# Patient Record
Sex: FEMALE | Race: WHITE | HISPANIC OR LATINO | Employment: OTHER | URBAN - METROPOLITAN AREA
[De-identification: names, ages, dates, MRNs, and addresses within clinical notes are randomized per-mention and may not be internally consistent; named-entity substitution may affect disease eponyms.]

---

## 2023-09-27 ENCOUNTER — OFFICE VISIT (OUTPATIENT)
Dept: GASTROENTEROLOGY | Facility: CLINIC | Age: 58
End: 2023-09-27
Payer: COMMERCIAL

## 2023-09-27 ENCOUNTER — LAB (OUTPATIENT)
Dept: LAB | Facility: HOSPITAL | Age: 58
End: 2023-09-27
Payer: COMMERCIAL

## 2023-09-27 VITALS
WEIGHT: 219.8 LBS | SYSTOLIC BLOOD PRESSURE: 130 MMHG | DIASTOLIC BLOOD PRESSURE: 86 MMHG | BODY MASS INDEX: 36.62 KG/M2 | HEIGHT: 65 IN

## 2023-09-27 DIAGNOSIS — Z12.11 SCREENING FOR MALIGNANT NEOPLASM OF COLON: ICD-10-CM

## 2023-09-27 DIAGNOSIS — K74.60 CIRRHOSIS OF LIVER WITHOUT ASCITES, UNSPECIFIED HEPATIC CIRRHOSIS TYPE: Primary | ICD-10-CM

## 2023-09-27 LAB
ALPHA-FETOPROTEIN: 2.98 NG/ML (ref 0–8.3)
BASOPHILS # BLD AUTO: 0.02 10*3/MM3 (ref 0–0.2)
BASOPHILS NFR BLD AUTO: 0.3 % (ref 0–1.5)
DEPRECATED RDW RBC AUTO: 41.2 FL (ref 37–54)
EOSINOPHIL # BLD AUTO: 0.05 10*3/MM3 (ref 0–0.4)
EOSINOPHIL NFR BLD AUTO: 0.8 % (ref 0.3–6.2)
ERYTHROCYTE [DISTWIDTH] IN BLOOD BY AUTOMATED COUNT: 12.2 % (ref 12.3–15.4)
HBV SURFACE AG SERPL QL IA: NORMAL
HCT VFR BLD AUTO: 43.6 % (ref 34–46.6)
HCV AB SER DONR QL: NORMAL
HGB BLD-MCNC: 14.1 G/DL (ref 12–15.9)
IMM GRANULOCYTES # BLD AUTO: 0.01 10*3/MM3 (ref 0–0.05)
IMM GRANULOCYTES NFR BLD AUTO: 0.2 % (ref 0–0.5)
INR PPP: 1 (ref 0.9–1.1)
LYMPHOCYTES # BLD AUTO: 2.05 10*3/MM3 (ref 0.7–3.1)
LYMPHOCYTES NFR BLD AUTO: 34.1 % (ref 19.6–45.3)
MCH RBC QN AUTO: 29.3 PG (ref 26.6–33)
MCHC RBC AUTO-ENTMCNC: 32.3 G/DL (ref 31.5–35.7)
MCV RBC AUTO: 90.5 FL (ref 79–97)
MONOCYTES # BLD AUTO: 0.34 10*3/MM3 (ref 0.1–0.9)
MONOCYTES NFR BLD AUTO: 5.7 % (ref 5–12)
NEUTROPHILS NFR BLD AUTO: 3.54 10*3/MM3 (ref 1.7–7)
NEUTROPHILS NFR BLD AUTO: 58.9 % (ref 42.7–76)
PLATELET # BLD AUTO: 252 10*3/MM3 (ref 140–450)
PMV BLD AUTO: 10 FL (ref 6–12)
PROTHROMBIN TIME: 13.2 SECONDS (ref 12.1–15)
RBC # BLD AUTO: 4.82 10*6/MM3 (ref 3.77–5.28)
WBC NRBC COR # BLD: 6.01 10*3/MM3 (ref 3.4–10.8)

## 2023-09-27 PROCEDURE — 82104 ALPHA-1-ANTITRYPSIN PHENO: CPT

## 2023-09-27 PROCEDURE — 86803 HEPATITIS C AB TEST: CPT

## 2023-09-27 PROCEDURE — 85610 PROTHROMBIN TIME: CPT

## 2023-09-27 PROCEDURE — 82105 ALPHA-FETOPROTEIN SERUM: CPT

## 2023-09-27 PROCEDURE — 36415 COLL VENOUS BLD VENIPUNCTURE: CPT

## 2023-09-27 PROCEDURE — 85025 COMPLETE CBC W/AUTO DIFF WBC: CPT

## 2023-09-27 PROCEDURE — 87340 HEPATITIS B SURFACE AG IA: CPT

## 2023-09-27 PROCEDURE — 82103 ALPHA-1-ANTITRYPSIN TOTAL: CPT

## 2023-09-27 RX ORDER — HYDROCHLOROTHIAZIDE 25 MG/1
25 TABLET ORAL DAILY
COMMUNITY
Start: 2023-06-20

## 2023-09-27 RX ORDER — ASPIRIN 81 MG/1
81 TABLET ORAL DAILY
COMMUNITY
Start: 2023-06-20

## 2023-09-27 RX ORDER — PRAVASTATIN SODIUM 10 MG
10 TABLET ORAL DAILY
Qty: 30 TABLET | Refills: 11 | COMMUNITY
Start: 2023-06-20 | End: 2024-06-19

## 2023-09-27 RX ORDER — DULOXETIN HYDROCHLORIDE 60 MG/1
60 CAPSULE, DELAYED RELEASE ORAL DAILY
COMMUNITY
Start: 2023-06-20

## 2023-09-27 RX ORDER — DIPHENOXYLATE HYDROCHLORIDE AND ATROPINE SULFATE 2.5; .025 MG/1; MG/1
1 TABLET ORAL
COMMUNITY

## 2023-09-27 RX ORDER — TELMISARTAN 20 MG/1
20 TABLET ORAL DAILY
COMMUNITY
Start: 2023-06-20

## 2023-09-27 NOTE — PROGRESS NOTES
PATIENT INFORMATION  Willis Erickson       - 1965    CHIEF COMPLAINT  Chief Complaint   Patient presents with    Cirrhosis       HISTORY OF PRESENT ILLNESS    Here today for evaluation of cirrhosis    Cirrhosis:   8/15/2023 Last CT Abd/Pelv hepatic cirrhosis  Last AFP: None  Last Labs:  Fluid Management: No ascites or diuretics, reviewed decreasing dietary sodium  Varices: No BB or previous EGD  HE: Cognitive Baseline    No issues with HB or reflux, no UGI complaints. No meds for GERD, no NSAIDs. Tylenol PRN, reviewed recommendations with cirrhosis.    Recently diagnosed with RA, mild ferritin elevation.    Bowels are moving daily, easy, no straining, no bleeding.    Cologuard 5 yrs ago. Dad with polyps.    Cirrhosis  Associated symptoms include congestion, coughing and fatigue. Pertinent negatives include no abdominal pain, nausea or vomiting.     REVIEWED PERTINENT RESULTS/ LABS  No results found for: CASEREPORT, FINALDX  Lab Results   Component Value Date    HGB 13.9 2019    MCV 92.2 2019     2019    ALT 18 10/22/2021    AST 21 10/22/2021    FERRITIN 154.0 (H) 2023    IRON 102 2023    TIBC 277 2023      Mammogram [facility] Diagnostic Right Breast    Result Date: 9/15/2023  Narrative: REVIEWING YOUR TEST RESULTS IN [facility] IS NOT A SUBSTITUTE FOR DISCUSSING THOSE RESULTS WITH YOUR HEALTH CARE PROVIDER. PLEASE CONTACT YOUR PROVIDER VIA [facility] TO DISCUSS ANY QUESTIONS OR CONCERNS YOU MAY HAVE REGARDING THESE TEST RESULTS.  RADIOLOGY REPORT FACILITY:  [facility] UNIT/AGE/GENDER: KULWINDERMA  OP      AGE:58 Y          SEX:F PATIENT NAME/:  WILLIS ERICKSON OLENA    1965 UNIT NUMBER:  [id number] ACCOUNT NUMBER:  [id number] ACCESSION NUMBER:  [id number] Examination: Right Digital Diagnostic Mammogram with Tomosynthesis and CAD. Date: 09/15/2023 10:12 AM Indication: Patient is a 58 year old female. The patient is seen for follow-up at  short-interval from prior study.    The patient has the following family history of breast cancer:  sister, at age 55, breast cancer. Comparison: The present examination has been compared to prior imaging studies performed at [facility] on 02/10/2023, and at [facility] on 01/12/2023. Findings: Right Mammogram: There are scattered areas of fibroglandular density. No suspicious abnormality identified. No suspicious interval change in the previously described 4 mm group of layering calcifications in the mid one third central aspect of the right breast characteristic of a benign pattern. Impression: No suspicious interval change on short-term follow-up. Final Assessment: Probably Benign (BIRADS Category 3) Recommendations: Bilateral diagnostic mammogram with right magnification views in 6 months to document one-year stability. The patient was notified of the results and recommendations at the time of examination. A result letter will be sent to the patient.  A reminder letter for the next mammogram will be scheduled for women aged 40 and over. Dictated by: Ese Macias M.D. Images and Report reviewed and interpreted by: Ese Macias M.D. <PS><Electronically signed by: Ese Macias M.D.> 09/15/2023 1042 D: 09/15/2023 1041 T: 09/15/2023 1041     REVIEW OF SYSTEMS  Review of Systems   Constitutional:  Positive for fatigue.   HENT:  Positive for congestion and sinus pressure.    Eyes: Negative.    Respiratory:  Positive for cough.    Cardiovascular: Negative.    Gastrointestinal:  Negative for abdominal pain, constipation, diarrhea, nausea and vomiting.        Cirrhosis    Endocrine: Negative.    Genitourinary: Negative.    Musculoskeletal:  Positive for back pain.   Skin: Negative.    Allergic/Immunologic: Negative.    Neurological: Negative.    Hematological: Negative.    Psychiatric/Behavioral: Negative.         ACTIVE PROBLEMS  There are no problems to display for this  "patient.        PAST MEDICAL HISTORY  Past Medical History:   Diagnosis Date    Hypertension     Tuberculosis 2018         SURGICAL HISTORY  History reviewed. No pertinent surgical history.      FAMILY HISTORY  Family History   Problem Relation Age of Onset    Colon polyps Father     Colon cancer Neg Hx     Esophageal cancer Neg Hx     Liver cancer Neg Hx     Liver disease Neg Hx     Rectal cancer Neg Hx     Stomach cancer Neg Hx          SOCIAL HISTORY  Social History     Occupational History    Not on file   Tobacco Use    Smoking status: Never     Passive exposure: Never    Smokeless tobacco: Never   Vaping Use    Vaping Use: Never used   Substance and Sexual Activity    Alcohol use: Not Currently    Drug use: Defer    Sexual activity: Defer         CURRENT MEDICATIONS    Current Outpatient Medications:     aspirin 81 MG EC tablet, Take 1 tablet by mouth Daily., Disp: , Rfl:     DULoxetine (CYMBALTA) 60 MG capsule, Take 1 capsule by mouth Daily., Disp: , Rfl:     hydroCHLOROthiazide (HYDRODIURIL) 25 MG tablet, Take 1 tablet by mouth Daily., Disp: , Rfl:     multivitamin (THERAGRAN) tablet tablet, Take 1 tablet by mouth., Disp: , Rfl:     pravastatin (PRAVACHOL) 10 MG tablet, Take 1 tablet by mouth Daily., Disp: 30 tablet, Rfl: 11    telmisartan (MICARDIS) 20 MG tablet, Take 1 tablet by mouth Daily., Disp: , Rfl:     ALLERGIES  Patient has no known allergies.    VITALS  Vitals:    09/27/23 0844   BP: 130/86   BP Location: Left arm   Patient Position: Sitting   Cuff Size: Adult   Weight: 99.7 kg (219 lb 12.8 oz)   Height: 165.1 cm (65\")       PHYSICAL EXAM  Debilities/Disabilities Identified: None  Emotional Behavior: Appropriate  Wt Readings from Last 3 Encounters:   09/27/23 99.7 kg (219 lb 12.8 oz)     Ht Readings from Last 1 Encounters:   09/27/23 165.1 cm (65\")     Body mass index is 36.58 kg/m².  Physical Exam  Constitutional:       General: She is not in acute distress.     Appearance: Normal appearance. She " is not ill-appearing.   HENT:      Head: Normocephalic and atraumatic.      Mouth/Throat:      Mouth: Mucous membranes are moist.      Pharynx: No posterior oropharyngeal erythema.   Eyes:      General: No scleral icterus.  Cardiovascular:      Rate and Rhythm: Normal rate and regular rhythm.      Heart sounds: Normal heart sounds.   Pulmonary:      Effort: Pulmonary effort is normal.      Breath sounds: Normal breath sounds.   Abdominal:      General: Abdomen is flat. Bowel sounds are normal. There is no distension.      Palpations: Abdomen is soft. There is no mass.      Tenderness: There is no abdominal tenderness. There is no guarding or rebound. Negative signs include Hickman's sign.      Hernia: No hernia is present.   Musculoskeletal:      Cervical back: Neck supple.   Skin:     General: Skin is warm.      Capillary Refill: Capillary refill takes less than 2 seconds.   Neurological:      General: No focal deficit present.      Mental Status: She is alert and oriented to person, place, and time.   Psychiatric:         Mood and Affect: Mood normal.         Behavior: Behavior normal.         Thought Content: Thought content normal.         Judgment: Judgment normal.       CLINICAL DATA REVIEWED   reviewed previous lab results and integrated with today's visit, reviewed notes from other physicians and/or last GI encounter, reviewed previous endoscopy results and available photos, reviewed surgical pathology results from previous biopsies    ASSESSMENT  Diagnoses and all orders for this visit:    Cirrhosis of liver without ascites, unspecified hepatic cirrhosis type  -     Hepatitis C Antibody  -     AFP Tumor Marker  -     CBC & Differential  -     Protime-INR  -     Hepatitis B Surface Antigen  -     Alpha - 1 - Antitrypsin Phenotype  -     Case Request; Standing  -     Case Request  -     US Liver; Future    Screening for malignant neoplasm of colon  -     Case Request; Standing  -     Case Request    Other  orders  -     aspirin 81 MG EC tablet; Take 1 tablet by mouth Daily.  -     pravastatin (PRAVACHOL) 10 MG tablet; Take 1 tablet by mouth Daily.  -     hydroCHLOROthiazide (HYDRODIURIL) 25 MG tablet; Take 1 tablet by mouth Daily.  -     telmisartan (MICARDIS) 20 MG tablet; Take 1 tablet by mouth Daily.  -     DULoxetine (CYMBALTA) 60 MG capsule; Take 1 capsule by mouth Daily.  -     multivitamin (THERAGRAN) tablet tablet; Take 1 tablet by mouth.  -     Obtain Informed Consent; Standing  -     Follow Anesthesia Guidelines / Protocol; Future          PLAN    Liver labs  EGD and Colon  Next US February  Ok to continue statin    Return in about 6 months (around 3/27/2024).    I have discussed the above plan with the patient.  They verbalize understanding and are in agreement with the plan.  They have been advised to contact the office for any questions, concerns, or changes related to their health.

## 2023-10-03 LAB
A1AT PHENOTYP SERPL IFE: NORMAL
A1AT SERPL-MCNC: 122 MG/DL (ref 101–187)

## 2023-12-20 ENCOUNTER — ANESTHESIA EVENT (OUTPATIENT)
Dept: PERIOP | Facility: HOSPITAL | Age: 58
End: 2023-12-20
Payer: COMMERCIAL

## 2023-12-22 ENCOUNTER — HOSPITAL ENCOUNTER (OUTPATIENT)
Facility: HOSPITAL | Age: 58
Setting detail: HOSPITAL OUTPATIENT SURGERY
Discharge: HOME | End: 2023-12-22
Attending: INTERNAL MEDICINE | Admitting: INTERNAL MEDICINE
Payer: COMMERCIAL

## 2023-12-22 ENCOUNTER — ANESTHESIA (OUTPATIENT)
Dept: PERIOP | Facility: HOSPITAL | Age: 58
End: 2023-12-22
Payer: COMMERCIAL

## 2023-12-22 VITALS
RESPIRATION RATE: 16 BRPM | WEIGHT: 214 LBS | DIASTOLIC BLOOD PRESSURE: 76 MMHG | BODY MASS INDEX: 35.65 KG/M2 | SYSTOLIC BLOOD PRESSURE: 120 MMHG | HEART RATE: 63 BPM | TEMPERATURE: 97.8 F | HEIGHT: 65 IN | OXYGEN SATURATION: 96 %

## 2023-12-22 DIAGNOSIS — K74.60 CIRRHOSIS OF LIVER WITHOUT ASCITES, UNSPECIFIED HEPATIC CIRRHOSIS TYPE: ICD-10-CM

## 2023-12-22 DIAGNOSIS — Z12.11 SCREENING FOR MALIGNANT NEOPLASM OF COLON: ICD-10-CM

## 2023-12-22 PROCEDURE — 43239 EGD BIOPSY SINGLE/MULTIPLE: CPT | Performed by: INTERNAL MEDICINE

## 2023-12-22 PROCEDURE — 88305 TISSUE EXAM BY PATHOLOGIST: CPT | Performed by: INTERNAL MEDICINE

## 2023-12-22 PROCEDURE — 45385 COLONOSCOPY W/LESION REMOVAL: CPT | Performed by: INTERNAL MEDICINE

## 2023-12-22 PROCEDURE — 25810000003 LACTATED RINGERS PER 1000 ML: Performed by: NURSE ANESTHETIST, CERTIFIED REGISTERED

## 2023-12-22 PROCEDURE — 25010000002 PROPOFOL 200 MG/20ML EMULSION: Performed by: NURSE ANESTHETIST, CERTIFIED REGISTERED

## 2023-12-22 RX ORDER — LIDOCAINE HYDROCHLORIDE 20 MG/ML
INJECTION, SOLUTION INFILTRATION; PERINEURAL AS NEEDED
Status: DISCONTINUED | OUTPATIENT
Start: 2023-12-22 | End: 2023-12-22 | Stop reason: SURG

## 2023-12-22 RX ORDER — GLYCOPYRROLATE 0.2 MG/ML
INJECTION INTRAMUSCULAR; INTRAVENOUS AS NEEDED
Status: DISCONTINUED | OUTPATIENT
Start: 2023-12-22 | End: 2023-12-22 | Stop reason: SURG

## 2023-12-22 RX ORDER — PROPOFOL 10 MG/ML
INJECTION, EMULSION INTRAVENOUS AS NEEDED
Status: DISCONTINUED | OUTPATIENT
Start: 2023-12-22 | End: 2023-12-22 | Stop reason: SURG

## 2023-12-22 RX ORDER — MAGNESIUM HYDROXIDE 1200 MG/15ML
LIQUID ORAL AS NEEDED
Status: DISCONTINUED | OUTPATIENT
Start: 2023-12-22 | End: 2023-12-22 | Stop reason: HOSPADM

## 2023-12-22 RX ORDER — SODIUM CHLORIDE 9 MG/ML
40 INJECTION, SOLUTION INTRAVENOUS AS NEEDED
Status: DISCONTINUED | OUTPATIENT
Start: 2023-12-22 | End: 2023-12-22 | Stop reason: HOSPADM

## 2023-12-22 RX ORDER — SODIUM CHLORIDE, SODIUM LACTATE, POTASSIUM CHLORIDE, CALCIUM CHLORIDE 600; 310; 30; 20 MG/100ML; MG/100ML; MG/100ML; MG/100ML
9 INJECTION, SOLUTION INTRAVENOUS CONTINUOUS PRN
Status: DISCONTINUED | OUTPATIENT
Start: 2023-12-22 | End: 2023-12-22 | Stop reason: HOSPADM

## 2023-12-22 RX ORDER — ONDANSETRON 2 MG/ML
4 INJECTION INTRAMUSCULAR; INTRAVENOUS ONCE AS NEEDED
Status: DISCONTINUED | OUTPATIENT
Start: 2023-12-22 | End: 2023-12-22 | Stop reason: HOSPADM

## 2023-12-22 RX ORDER — SODIUM CHLORIDE 0.9 % (FLUSH) 0.9 %
10 SYRINGE (ML) INJECTION AS NEEDED
Status: DISCONTINUED | OUTPATIENT
Start: 2023-12-22 | End: 2023-12-22 | Stop reason: HOSPADM

## 2023-12-22 RX ORDER — SODIUM CHLORIDE 0.9 % (FLUSH) 0.9 %
10 SYRINGE (ML) INJECTION EVERY 12 HOURS SCHEDULED
Status: DISCONTINUED | OUTPATIENT
Start: 2023-12-22 | End: 2023-12-22 | Stop reason: HOSPADM

## 2023-12-22 RX ADMIN — GLYCOPYRROLATE 0.2 MG: 0.2 INJECTION INTRAMUSCULAR; INTRAVENOUS at 14:13

## 2023-12-22 RX ADMIN — GLYCOPYRROLATE 0.1 MG: 0.2 INJECTION INTRAMUSCULAR; INTRAVENOUS at 13:53

## 2023-12-22 RX ADMIN — SODIUM CHLORIDE, POTASSIUM CHLORIDE, SODIUM LACTATE AND CALCIUM CHLORIDE: 600; 310; 30; 20 INJECTION, SOLUTION INTRAVENOUS at 14:30

## 2023-12-22 RX ADMIN — LIDOCAINE HYDROCHLORIDE 100 MG: 20 INJECTION, SOLUTION INFILTRATION; PERINEURAL at 13:53

## 2023-12-22 RX ADMIN — PROPOFOL INJECTABLE EMULSION 550 MG: 10 INJECTION, EMULSION INTRAVENOUS at 13:53

## 2023-12-22 RX ADMIN — SODIUM CHLORIDE, POTASSIUM CHLORIDE, SODIUM LACTATE AND CALCIUM CHLORIDE 9 ML/HR: 600; 310; 30; 20 INJECTION, SOLUTION INTRAVENOUS at 12:46

## 2023-12-22 NOTE — BRIEF OP NOTE
ESOPHAGOGASTRODUODENOSCOPY WITH BIOPSY, COLONOSCOPY WITH POLYPECTOMY  Progress Note    Donna Mari  12/22/2023    Pre-op Diagnosis:   Cirrhosis of liver without ascites, unspecified hepatic cirrhosis type [K74.60]  Screening for malignant neoplasm of colon [Z12.11]       Post-Op Diagnosis Codes:     * Cirrhosis of liver without ascites, unspecified hepatic cirrhosis type [K74.60]     * Screening for malignant neoplasm of colon [Z12.11]     * Gastritis [K29.70]     * Reflux esophagitis [K21.00]     * Colon polyp [K63.5]     * Diverticulosis [K57.90]    Procedure/CPT® Codes:        Procedure(s):  ESOPHAGOGASTRODUODENOSCOPY WITH BIOPSY  COLONOSCOPY WITH POLYPECTOMY              Surgeon(s):  Quirino Willingham MD    Anesthesia: Monitored Anesthesia Care    Staff:   Circulator: Noris Buckley RN  Scrub Person: Cherry Ross RN         Estimated Blood Loss: none    Urine Voided: * No values recorded between 12/22/2023  1:44 PM and 12/22/2023  2:33 PM *    Specimens:                Specimens       ID Source Type Tests Collected By Collected At Frozen?    A Gastric, Antrum Tissue TISSUE PATHOLOGY EXAM   Quirino Willingham MD 12/22/23 1359     B Esophagus, Distal Tissue TISSUE PATHOLOGY EXAM   Quirino Willingham MD 12/22/23 1400     C Large Intestine, Right / Ascending Colon Polyp TISSUE PATHOLOGY EXAM   Quirino Willingham MD 12/22/23 1426     Description: x3    D Large Intestine, Rectum Polyp TISSUE PATHOLOGY EXAM   Quirino Willingham MD 12/22/23 1431                   Drains: * No LDAs found *    Findings: Normal Duodenum  Mild Gastritis-Biopsy  Reflux Esophagitis-Biopsy  No varices thru-out    Difficult(Looping) Colon to ICV Fair Prep  Polyps-4-Cold Snare  Diverticulosis        Complications: none          Quirino Willingham MD     Date: 12/22/2023  Time: 14:37 EST

## 2023-12-22 NOTE — H&P
"Patient Care Team:  Valencia Milian APRN as PCP - General (Nurse Practitioner)    CHIEF COMPLAINT: Screening CRC, Family hx of CRC or polyps, and Cirrhosis    HISTORY OF PRESENT ILLNESS:  Father with polyps and last Cologard was 5 years ago    Past Medical History:   Diagnosis Date    Hypertension     Tuberculosis 2018     History reviewed. No pertinent surgical history.  Family History   Problem Relation Age of Onset    Colon polyps Father     Colon cancer Neg Hx     Esophageal cancer Neg Hx     Liver cancer Neg Hx     Liver disease Neg Hx     Rectal cancer Neg Hx     Stomach cancer Neg Hx      Social History     Tobacco Use    Smoking status: Never     Passive exposure: Never    Smokeless tobacco: Never   Vaping Use    Vaping Use: Never used   Substance Use Topics    Alcohol use: Not Currently    Drug use: Defer     Medications Prior to Admission   Medication Sig Dispense Refill Last Dose    DULoxetine (CYMBALTA) 60 MG capsule Take 1 capsule by mouth Daily.   12/22/2023 at 0630    pravastatin (PRAVACHOL) 10 MG tablet Take 1 tablet by mouth Daily. 30 tablet 11 12/22/2023 at 0630    telmisartan (MICARDIS) 20 MG tablet Take 1 tablet by mouth Daily.   12/22/2023 at 0630    aspirin 81 MG EC tablet Take 1 tablet by mouth Daily.   12/17/2023    hydroCHLOROthiazide (HYDRODIURIL) 25 MG tablet Take 1 tablet by mouth Daily.   12/20/2023    multivitamin (THERAGRAN) tablet tablet Take 1 tablet by mouth.   12/17/2023     Allergies:  Patient has no known allergies.    REVIEW OF SYSTEMS:  Please see the above history of present illness for pertinent positives and negatives.  The remainder of the patient's systems have been reviewed and are negative.     Vital Signs  Temp:  [98.1 °F (36.7 °C)] 98.1 °F (36.7 °C)  Heart Rate:  [61] 61  Resp:  [18] 18  BP: (112)/(99) 112/99    Flowsheet Rows      Flowsheet Row First Filed Value   Admission Height 165.1 cm (65\") Documented at 12/22/2023 1248   Admission Weight 97.1 kg (214 lb) " Documented at 12/22/2023 1248             Physical Exam:  Physical Exam   Constitutional: Patient appears well-developed and well-nourished and in no acute distress   HEENT:   Head: Normocephalic and atraumatic.   Eyes:  Pupils are equal, round, and reactive to light. EOM are intact. Sclerae are anicteric and non-injected.  Mouth and Throat: Patient has moist mucous membranes. Oropharynx is clear of any erythema or exudate.     Neck: Neck supple. No JVD present. No thyromegaly present. No lymphadenopathy present.  Cardiovascular: Regular rate, regular rhythm, S1 normal and S2 normal.  Exam reveals no gallop and no friction rub.  No murmur heard.  Pulmonary/Chest: Lungs are clear to auscultation bilaterally. No respiratory distress. No wheezes. No rhonchi. No rales.   Abdominal: Soft. Bowel sounds are normal. No distension and no mass. There is no hepatosplenomegaly. There is no tenderness.   Musculoskeletal: Normal Muscle tone  Extremities: No edema. Pulses are palpable in all 4 extremities.  Neurological: Patient is alert and oriented to person, place, and time. Cranial nerves II-XII are grossly intact with no focal deficits.  Skin: Skin is warm. No rash noted. Nails show no clubbing.  No cyanosis or erythema.    Debilities/Disabilities Identified: None  Emotional Behavior: Appropriate     Results Review:   I reviewed the patient's new clinical results.    Lab Results (most recent)       None            Imaging Results (Most Recent)       None          reviewed    ECG/EMG Results (most recent)       None          reviewed    Assessment & Plan   Screening CRC, Family hx of CRC or polyps, and Cirrhosis /  EGD and colonoscopy      I discussed the patient's findings and my recommendations with patient.     Quirino Willingham MD  12/22/23  13:38 EST    Time: 10 min prior to procedure.

## 2023-12-22 NOTE — ANESTHESIA PREPROCEDURE EVALUATION
Anesthesia Evaluation     Patient summary reviewed and Nursing notes reviewed   no history of anesthetic complications:   NPO Solid Status: > 8 hours  NPO Liquid Status: > 6 hours           Airway   Mallampati: II  TM distance: >3 FB  Neck ROM: full  No difficulty expected  Dental      Pulmonary     breath sounds clear to auscultation  (+) a smoker Former,  Cardiovascular   Exercise tolerance: good (4-7 METS)    PT is on anticoagulation therapy  Rhythm: regular  Rate: normal    (+) hypertension well controlled less than 2 medications, hyperlipidemia      Neuro/Psych  (+) psychiatric history Anxiety  GI/Hepatic/Renal/Endo    (+) liver disease cirrhosis    Musculoskeletal     (+) back pain  Abdominal    Substance History - negative use     OB/GYN          Other - negative ROS       ROS/Med Hx Other: Hx of TB and liver disease due to tx meds                 Anesthesia Plan    ASA 3     MAC       Anesthetic plan, risks, benefits, and alternatives have been provided, discussed and informed consent has been obtained with: patient.    Use of blood products discussed with patient  Consented to blood products.      CODE STATUS:

## 2023-12-22 NOTE — ANESTHESIA POSTPROCEDURE EVALUATION
Patient: Donna Mari    Procedure Summary       Date: 12/22/23 Room / Location: [facility] ENDOSCOPY 1 /  [facility]    Anesthesia Start: 1344 Anesthesia Stop: 1436    Procedures:       ESOPHAGOGASTRODUODENOSCOPY WITH BIOPSY (Esophagus)      COLONOSCOPY WITH POLYPECTOMY Diagnosis:       Cirrhosis of liver without ascites, unspecified hepatic cirrhosis type      Screening for malignant neoplasm of colon      Gastritis      Reflux esophagitis      Colon polyp      Diverticulosis      (Cirrhosis of liver without ascites, unspecified hepatic cirrhosis type [K74.60])      (Screening for malignant neoplasm of colon [Z12.11])    Surgeons: Quirino Willingham MD Provider: Shelbi Rhodes CRNA    Anesthesia Type: MAC ASA Status: 3            Anesthesia Type: MAC    Vitals  Vitals Value Taken Time   /78 12/22/23 1500   Temp 97.8 °F (36.6 °C) 12/22/23 1439   Pulse 61 12/22/23 1507   Resp 16 12/22/23 1450   SpO2 98 % 12/22/23 1507   Vitals shown include unfiled device data.        Post Anesthesia Care and Evaluation    Patient location during evaluation: PHASE II  Patient participation: complete - patient participated  Level of consciousness: awake and alert  Pain score: 0  Pain management: adequate    Airway patency: patent  Anesthetic complications: No anesthetic complications  PONV Status: none  Cardiovascular status: acceptable  Respiratory status: acceptable  Hydration status: acceptable

## 2023-12-27 LAB
LAB AP CASE REPORT: NORMAL
PATH REPORT.FINAL DX SPEC: NORMAL
PATH REPORT.GROSS SPEC: NORMAL

## 2024-02-02 ENCOUNTER — HOSPITAL ENCOUNTER (OUTPATIENT)
Facility: HOSPITAL | Age: 59
Discharge: HOME | End: 2024-02-02
Payer: COMMERCIAL

## 2024-02-02 DIAGNOSIS — K74.60 CIRRHOSIS OF LIVER WITHOUT ASCITES, UNSPECIFIED HEPATIC CIRRHOSIS TYPE: ICD-10-CM

## 2024-02-02 PROCEDURE — 76705 ECHO EXAM OF ABDOMEN: CPT

## 2024-02-05 ENCOUNTER — TELEPHONE (OUTPATIENT)
Dept: GASTROENTEROLOGY | Facility: CLINIC | Age: 59
End: 2024-02-05
Payer: COMMERCIAL

## 2024-02-06 DIAGNOSIS — K74.60 CIRRHOSIS OF LIVER WITHOUT ASCITES, UNSPECIFIED HEPATIC CIRRHOSIS TYPE: Primary | ICD-10-CM

## 2024-03-21 ENCOUNTER — TELEPHONE (OUTPATIENT)
Dept: GASTROENTEROLOGY | Facility: CLINIC | Age: 59
End: 2024-03-21
Payer: COMMERCIAL

## 2024-03-21 NOTE — TELEPHONE ENCOUNTER
Called for test result from yesterday.  [facility]  They told I would be read yesterday and call today for results.  Phone number correct in chart.  Please call.

## 2024-03-21 NOTE — TELEPHONE ENCOUNTER
Per JS:  Results suggest no fat in liver and NO FIBROSIS despite CT showing cirrhosis and US suggestive of cirrhosis. The fibroscan is a more trusted measure of the liver stiffness. Would like to repeat 3 phase CT in 1 yr, otherwise will continue to monitor l abs.

## 2024-03-28 ENCOUNTER — OFFICE VISIT (OUTPATIENT)
Dept: GASTROENTEROLOGY | Facility: CLINIC | Age: 59
End: 2024-03-28
Payer: COMMERCIAL

## 2024-03-28 VITALS
HEIGHT: 65 IN | DIASTOLIC BLOOD PRESSURE: 84 MMHG | SYSTOLIC BLOOD PRESSURE: 124 MMHG | WEIGHT: 213.8 LBS | BODY MASS INDEX: 35.62 KG/M2

## 2024-03-28 DIAGNOSIS — K21.00 GASTROESOPHAGEAL REFLUX DISEASE WITH ESOPHAGITIS WITHOUT HEMORRHAGE: Primary | ICD-10-CM

## 2024-03-28 DIAGNOSIS — Z86.010 PERSONAL HISTORY OF COLONIC POLYPS: ICD-10-CM

## 2024-03-28 DIAGNOSIS — R93.2 ABNORMAL LIVER CT: ICD-10-CM

## 2024-03-28 PROBLEM — K74.60 CIRRHOSIS OF LIVER WITHOUT ASCITES: Status: RESOLVED | Noted: 2023-09-27 | Resolved: 2024-03-28

## 2024-03-28 PROBLEM — Z12.11 SCREENING FOR MALIGNANT NEOPLASM OF COLON: Status: RESOLVED | Noted: 2023-09-27 | Resolved: 2024-03-28

## 2024-03-28 NOTE — PROGRESS NOTES
PATIENT INFORMATION  Donna Mari       - 1965    CHIEF COMPLAINT  Chief Complaint   Patient presents with    Cirrhosis    Heartburn       HISTORY OF PRESENT ILLNESS  Here today for EGD and Colon follow-up    2023 Last EGD and Colon positive for chronic gastritis, reflux no barretts, HP or varices. Colon with 4 adenomas, recall in 3 yrs with 2 day prep. Reviewed path and imaging with patient.    Has been eating better. Denies UGI sx. Has been losing about a lb a week, down 20 lbs.    Cirrhosis? On CT, though fibroscan 3/20/2024 with NO steatosis or Fibrosis. Will complete 3 phase CT in 1 yr.  Last AFP: 2023      Cirrhosis  Associated symptoms include arthralgias. Pertinent negatives include no abdominal pain, nausea or vomiting.   Heartburn  She reports no abdominal pain or no nausea.       REVIEWED PERTINENT RESULTS/ LABS  Lab Results   Component Value Date    CASEREPORT  2023     Surgical Pathology Report                         Case: [id number]                                  Authorizing Provider:  Quirino Willingham        Collected:           2023 01:59 PM                                 MD Garima                                                                   Ordering Location:     [facility]   Received:            2023 04:11 PM                                 OR                                                                           Pathologist:           Camilo Hartman MD                                                         Specimens:   1) - Gastric, Antrum                                                                                2) - Esophagus, Distal                                                                              3) - Large Intestine, Right / Ascending Colon, x3                                                   4) - Large Intestine, Rectum                                                               FINALDX  2023     1.   Stomach, antrum, biopsy: Benign gastric mucosa with   -A. Mild chronic inflammation (mild non-specific chronic gastritis).    -B. No intestinal metaplasia.   -C. No Helicobacter pylori.     2.  Esophagus, distal, biopsy: Benign squamous and gastric mucosa with-   -A. Chronic inflammation in the gastric mucosa.   -B. No intestinal metaplasia.   -C. No Helicobacter pylori.     3.  Colon, right ascending, biopsy: Multiple fragments of tubular adenoma    4.  Rectum, biopsy: Multiple fragments of tubular adenoma       Lab Results   Component Value Date    HGB 14.1 2023    MCV 90.5 2023     2023    ALT 22 2022    AST 22 2022    INR 1.00 2023    FERRITIN 154.0 (H) 2023    IRON 102 2023    TIBC 277 2023      US Fibroscan    Result Date: 3/20/2024  Narrative: REVIEWING YOUR TEST RESULTS IN [facility] IS NOT A SUBSTITUTE FOR DISCUSSING THOSE RESULTS WITH YOUR HEALTH CARE PROVIDER. PLEASE CONTA[facility] YOUR PROVIDER [facility] TO DISCUSS ANY QUESTIONS OR CONCERNS YOU MAY HAVE REGARDING THESE TEST RESULTS.  RADIOLOGY REPORT FACILITY:  [facility] UNIT/AGE/GENDER: MARCELINO  OP      AGE:59 Y          SEX:F PATIENT NAME/:  WILLIS ERICKSON OLENA    1965 UNIT NUMBER:  [id number] ACCOUNT NUMBER:  [id number] ACCESSION NUMBER:  [id number] Fibroscan (Liver elastography without imaging) on 2024 9:07 AM INDICATION: Cirrhosis of liver without ascites, unspecified hepatic cirrhosis type. TECHNIQUE: Liver elastography performed via mechanically-induced shear wave technique, without imaging. At least 10 measurements were performed,  with all reviewed by  and physician for technical accuracy, with goal variability as quantified by IQR/median percentage of less than 30%. FINDINGS: Hepatic parenchymal stiffness (measure of elasticity) : 6.4  kPa, with  IQR/Median percentage of  9 % Hepatic controlled attenuation parameter (CAP, measure of ultrasound  attenuation: 197  dB/M IMPRESSION: 1.  Hepatic fibrosis grade (scale F0-F4):  F0. Normal. 2.  Hepatic steatosis grade (scale S0-S3):  S0. No steatosis. Fibrosis grading criteria (in kPa): DISEASEF0-F1 Normal to Mild FibrosisF2 Moderate FibrosisF3 Significant FibrosisF4 Cirrhosis HBV<6.0>6.0>9.0>12.0 HCV<7.0>7.0>9.5>12.0 HCV-HIV<7.0<10>11.0>14.0 CHOLESTATIC<7.0>7.5>10.0>17.0 NAFLD/PIERCE<7.0>7.5>10.0>14.0 Alcohol related<7.0>7.0>11.0>19.0 Steatosis grading criteria (in dB/M) GRADECAP CUTOFF S0  No steatosis<248 S1  Mild [id number] S2  Moderate [id number] S3  Severe steatosis>281 Dictated by: Matt Fajardo M.D. Images and Report reviewed and interpreted by: Matt Fajardo M.D. <PS><Electronically signed by: Matt Fajardo M.D.> 03/20/2024 1209 D: 03/20/2024 1208 T: 03/20/2024 1208     REVIEW OF SYSTEMS  Review of Systems   Constitutional: Negative.    HENT: Negative.     Eyes: Negative.    Respiratory: Negative.     Cardiovascular: Negative.    Gastrointestinal:  Negative for abdominal pain, constipation, diarrhea, nausea and vomiting.        GERD, Cirrhosis    Endocrine: Negative.    Genitourinary: Negative.    Musculoskeletal:  Positive for arthralgias.   Skin: Negative.    Allergic/Immunologic: Negative.    Neurological: Negative.    Hematological: Negative.    Psychiatric/Behavioral: Negative.           ACTIVE PROBLEMS  Patient Active Problem List    Diagnosis     Abnormal liver CT [R93.2]     Gastroesophageal reflux disease with esophagitis without hemorrhage [K21.00]          PAST MEDICAL HISTORY  Past Medical History:   Diagnosis Date    Hypertension     Tuberculosis 2018         SURGICAL HISTORY  Past Surgical History:   Procedure Laterality Date    COLONOSCOPY W/ POLYPECTOMY N/A 12/22/2023    Procedure: COLONOSCOPY WITH POLYPECTOMY;  Surgeon: Quirino Willingham MD;  Location: [facility];  Service: Gastroenterology;  Laterality: N/A;  Diverticulosis; Ascending polyp x3 (cold snare);  "Rectal polyp x1 (cold snare)    ENDOSCOPY N/A 12/22/2023    Procedure: ESOPHAGOGASTRODUODENOSCOPY WITH BIOPSY;  Surgeon: Quirino Willingham MD;  Location: [facility] OR;  Service: Gastroenterology;  Laterality: N/A;  Gastric biopsy; Distal esophagus biopsy; Reflux; Gastritis         FAMILY HISTORY  Family History   Problem Relation Age of Onset    Colon polyps Father     Colon cancer Neg Hx     Esophageal cancer Neg Hx     Liver cancer Neg Hx     Liver disease Neg Hx     Rectal cancer Neg Hx     Stomach cancer Neg Hx          SOCIAL HISTORY  Social History     Occupational History    Not on file   Tobacco Use    Smoking status: Never     Passive exposure: Never    Smokeless tobacco: Never   Vaping Use    Vaping status: Never Used   Substance and Sexual Activity    Alcohol use: Not Currently    Drug use: Defer    Sexual activity: Defer         CURRENT MEDICATIONS    Current Outpatient Medications:     aspirin 81 MG EC tablet, Take 1 tablet by mouth Daily., Disp: , Rfl:     DULoxetine (CYMBALTA) 60 MG capsule, Take 1 capsule by mouth Daily., Disp: , Rfl:     hydroCHLOROthiazide (HYDRODIURIL) 25 MG tablet, Take 1 tablet by mouth Daily., Disp: , Rfl:     multivitamin (THERAGRAN) tablet tablet, Take 1 tablet by mouth., Disp: , Rfl:     pravastatin (PRAVACHOL) 10 MG tablet, Take 1 tablet by mouth Daily., Disp: 30 tablet, Rfl: 11    telmisartan (MICARDIS) 20 MG tablet, Take 1 tablet by mouth Daily., Disp: , Rfl:     ALLERGIES  Patient has no known allergies.    VITALS  Vitals:    03/28/24 0844   BP: 124/84   BP Location: Left arm   Patient Position: Sitting   Cuff Size: Large Adult   Weight: 97 kg (213 lb 12.8 oz)   Height: 165.1 cm (65\")       PHYSICAL EXAM  Debilities/Disabilities Identified: None  Emotional Behavior: Appropriate  Wt Readings from Last 3 Encounters:   03/28/24 97 kg (213 lb 12.8 oz)   12/22/23 97.1 kg (214 lb)   09/27/23 99.7 kg (219 lb 12.8 oz)     Ht Readings from Last 1 Encounters:   03/28/24 " "165.1 cm (65\")     Body mass index is 35.58 kg/m².  Physical Exam  Constitutional:       General: She is not in acute distress.     Appearance: Normal appearance. She is not ill-appearing.   HENT:      Head: Normocephalic and atraumatic.      Mouth/Throat:      Mouth: Mucous membranes are moist.      Pharynx: No posterior oropharyngeal erythema.   Eyes:      General: No scleral icterus.  Cardiovascular:      Rate and Rhythm: Normal rate and regular rhythm.      Heart sounds: Normal heart sounds.   Pulmonary:      Effort: Pulmonary effort is normal.      Breath sounds: Normal breath sounds.   Abdominal:      General: Abdomen is flat. Bowel sounds are normal. There is no distension.      Palpations: Abdomen is soft. There is no mass.      Tenderness: There is no abdominal tenderness. There is no guarding or rebound. Negative signs include Hickman's sign.      Hernia: No hernia is present.   Musculoskeletal:      Cervical back: Neck supple.   Skin:     General: Skin is warm.      Capillary Refill: Capillary refill takes less than 2 seconds.   Neurological:      General: No focal deficit present.      Mental Status: She is alert and oriented to person, place, and time.   Psychiatric:         Mood and Affect: Mood normal.         Behavior: Behavior normal.         Thought Content: Thought content normal.         Judgment: Judgment normal.         CLINICAL DATA REVIEWED   reviewed previous lab results and integrated with today's visit, reviewed notes from other physicians and/or last GI encounter, reviewed previous endoscopy results and available photos, reviewed surgical pathology results from previous biopsies      ASSESSMENT  Diagnoses and all orders for this visit:    Gastroesophageal reflux disease with esophagitis without hemorrhage    Abnormal liver CT    Personal history of colonic polyps          PLAN    Continue lifestyle changes  3 phase CT in about 1 yr    Return in about 6 months (around 9/28/2024).    I have " discussed the above plan with the patient.  They verbalize understanding and are in agreement with the plan.  They have been advised to contact the office for any questions, concerns, or changes related to their health.

## (undated) DEVICE — LINER SURG CANSTR SXN S/RIGD 1500CC

## (undated) DEVICE — GLV SURG SENSICARE PI MIC PF SZ8 LF STRL

## (undated) DEVICE — ADAPT CLN BIOGUARD AIR/H2O DISP

## (undated) DEVICE — THE BITE BLOCK MAXI, LATEX FREE STRAP IS USED TO PROTECT THE ENDOSCOPE INSERTION TUBE FROM BEING BITTEN BY THE PATIENT.

## (undated) DEVICE — Device

## (undated) DEVICE — KT ORCA ORCAPOD DISP STRL

## (undated) DEVICE — SYR LL 3CC

## (undated) DEVICE — SOL IRR H2O BTL 1000ML STRL

## (undated) DEVICE — JACKT LAB F/R KNIT CUFF/COLR XLG BLU

## (undated) DEVICE — VIAL FORMALIN CAP 10P 40ML

## (undated) DEVICE — THE SINGLE USE ETRAP – POLYP TRAP IS USED FOR SUCTION RETRIEVAL OF ENDOSCOPICALLY REMOVED POLYPS.: Brand: ETRAP

## (undated) DEVICE — FRCP BX RADJAW4 NDL 2.8 240CM LG OG BX40

## (undated) DEVICE — SNAR POLYP CAPTIVATOR/COLD STFF RND 10MM 240CM

## (undated) DEVICE — BW-412T DISP COMBO CLEANING BRUSH: Brand: SINGLE USE COMBINATION CLEANING BRUSH